# Patient Record
Sex: MALE | Race: WHITE | Employment: FULL TIME | URBAN - METROPOLITAN AREA
[De-identification: names, ages, dates, MRNs, and addresses within clinical notes are randomized per-mention and may not be internally consistent; named-entity substitution may affect disease eponyms.]

---

## 2024-09-03 ENCOUNTER — TELEPHONE (OUTPATIENT)
Dept: ORTHOPEDIC SURGERY | Age: 54
End: 2024-09-03

## 2024-09-03 RX ORDER — OMEPRAZOLE 40 MG/1
40 CAPSULE, DELAYED RELEASE ORAL DAILY
COMMUNITY
Start: 2024-07-19

## 2024-09-03 RX ORDER — MULTIVITAMIN
1 TABLET ORAL DAILY
COMMUNITY

## 2024-09-03 RX ORDER — EPINEPHRINE 0.3 MG/.3ML
0.3 INJECTION SUBCUTANEOUS SEE ADMIN INSTRUCTIONS
COMMUNITY
Start: 2024-07-19

## 2024-09-03 RX ORDER — FLUOROURACIL 50 MG/G
CREAM TOPICAL
COMMUNITY
Start: 2023-07-20

## 2024-09-03 NOTE — TELEPHONE ENCOUNTER
General Question     Subject: FOR JUAN DANIEL  Patient and /or Facility Request: Savage Hannon   Contact Number:[phone]       NP/OLD RECONSTRUCTIVE RT KNEE SX IS GIVING OUT FROM 1993, DONE BY DR ESTHER HOUSE/ XR DONE AT [facility]

## 2024-09-03 NOTE — TELEPHONE ENCOUNTER
Called and spoke with patient. He had X-rays at [facility]. Patient scheduled for 9-6-24.

## 2024-09-06 ENCOUNTER — OFFICE VISIT (OUTPATIENT)
Dept: ORTHOPEDIC SURGERY | Age: 54
End: 2024-09-06

## 2024-09-06 VITALS — BODY MASS INDEX: 24.17 KG/M2 | HEIGHT: 67 IN | RESPIRATION RATE: 12 BRPM | WEIGHT: 154 LBS

## 2024-09-06 DIAGNOSIS — M25.561 ACUTE PAIN OF RIGHT KNEE: Primary | ICD-10-CM

## 2024-09-06 RX ORDER — METHYLPREDNISOLONE ACETATE 40 MG/ML
80 INJECTION, SUSPENSION INTRA-ARTICULAR; INTRALESIONAL; INTRAMUSCULAR; SOFT TISSUE ONCE
Status: COMPLETED | OUTPATIENT
Start: 2024-09-06 | End: 2024-09-06

## 2024-09-06 RX ADMIN — Medication 3 ML: at 11:19

## 2024-09-06 RX ADMIN — METHYLPREDNISOLONE ACETATE 80 MG: 40 INJECTION, SUSPENSION INTRA-ARTICULAR; INTRALESIONAL; INTRAMUSCULAR; SOFT TISSUE at 11:20

## 2024-09-06 NOTE — PROGRESS NOTES
Savage Hannon is seen today for complaints of right knee pain.    He had an ACL reconstruction in 1993.  He has been very active.  He runs.  About 5 months ago he twisted his knee while running and now it remains painful and stiff and gives way.  Pain ranges from 5-9 out of 10.        He has reflux and high cholesterol.  Past medical history significant for rotator cuff repair September 2021.    History: Patient's relevant past family, medical, and social history are reviewed as part of today's visit. ROS of pertinent positives and negatives as above; otherwise negative.    General Exam:    Vitals: Resp. rate 12, height 1.702 m (5' 7\"), weight 69.9 kg (154 lb).  Constitutional: Patient is adequately groomed with no evidence of malnutrition  Mental Status: The patient is oriented to time, place and person.  The patient's mood and affect are appropriate.  Gait:  Patient walks with normal gait and station.  Lymphatic: The lymphatic examination bilaterally reveals all areas to be without enlargement or induration.  Vascular: Examination reveals no swelling or calf tenderness.  Peripheral pulses are palpable and 2+.  Neurological: The patient has good coordination.  There is no weakness or sensory deficit.    Skin:    Head/Neck: inspection reveals no rashes, ulcerations or lesions.  Trunk:  inspection reveals no rashes, ulcerations or lesions.  Right Lower Extremity: inspection reveals no rashes, ulcerations or lesions.  Left Lower Extremity: inspection reveals no rashes, ulcerations or lesions.        Examination of the bilateral hips reveals normal flexion and extension.  There is no restriction in rotation.  There is no tenderness to palpation anteriorly posteriorly or laterally.    Left knee examination demonstrates no effusion.  There is no tenderness to palpation over the medial or lateral joint line.  There is no discomfort over the patellar tendon.  There is no palpable popliteal cyst.  Sensation is intact.

## 2024-10-11 ENCOUNTER — OFFICE VISIT (OUTPATIENT)
Dept: ORTHOPEDIC SURGERY | Age: 54
End: 2024-10-11
Payer: COMMERCIAL

## 2024-10-11 VITALS — BODY MASS INDEX: 24.17 KG/M2 | WEIGHT: 154 LBS | RESPIRATION RATE: 12 BRPM | HEIGHT: 67 IN

## 2024-10-11 DIAGNOSIS — M25.561 ACUTE PAIN OF RIGHT KNEE: Primary | ICD-10-CM

## 2024-10-11 DIAGNOSIS — M17.11 ARTHRITIS OF RIGHT KNEE: ICD-10-CM

## 2024-10-11 PROCEDURE — 99214 OFFICE O/P EST MOD 30 MIN: CPT | Performed by: ORTHOPAEDIC SURGERY

## 2024-10-11 RX ORDER — MELOXICAM 15 MG/1
15 TABLET ORAL DAILY
Qty: 30 TABLET | Refills: 3 | Status: SHIPPED | OUTPATIENT
Start: 2024-10-11

## 2024-10-11 NOTE — PROGRESS NOTES
Savage Hannon is seen today for 1 month follow-up after aspiration and injection for significant right knee arthritis.  It helped for about 3 weeks but his pain is returned.  He is very frustrated because he wants to resume exercise activity.  Pain ranges from 4-6 out of 10.  General Exam:    Vitals: Resp. rate 12, height 1.702 m (5' 7\"), weight 69.9 kg (154 lb).  Constitutional: Patient is adequately groomed with no evidence of malnutrition  Mental Status: The patient is oriented to time, place and person.  The patient's mood and affect are appropriate.          Right knee has significant pain over the medial joint line.  He has severe crepitation.    I again reviewed his x-rays showing severe arthritic change.    Assessment: Significant arthritis that did not respond well to injection.    Plan: He needs to least contemplate arthroplasty.  I am writing a prescription for meloxicam.  I am asking to see Dr. Nava for his consideration of the neck step in surgical treatment.  His insurance does not cover viscosupplementation

## 2024-10-21 ENCOUNTER — OFFICE VISIT (OUTPATIENT)
Dept: ORTHOPEDIC SURGERY | Age: 54
End: 2024-10-21
Payer: COMMERCIAL

## 2024-10-21 VITALS — BODY MASS INDEX: 24.17 KG/M2 | WEIGHT: 154 LBS | HEIGHT: 67 IN

## 2024-10-21 DIAGNOSIS — M17.11 ARTHRITIS OF RIGHT KNEE: Primary | ICD-10-CM

## 2024-10-21 PROCEDURE — 99213 OFFICE O/P EST LOW 20 MIN: CPT | Performed by: ORTHOPAEDIC SURGERY

## 2024-10-22 NOTE — PROGRESS NOTES
[facility]  Office Visit    Chief Complaint: Right knee pain    HPI:  Savage Hannon is a 54 y.o. who is here for evaluation of right knee pain.  He has been seen and treated by Dr. Fisher in the past.  He does report a history of right knee ACL tear with playing softballs in 1993.  He underwent ACL reconstruction with Dr. Hargrove at that time.  He has had intermittent pain over the last 2 years.  Symptoms have been becoming more significant and increasing in frequency.  He has pain on a near daily basis at this point.  He takes meloxicam to help relieve the pain.  He has undergone aspiration and steroid injection in the past, most recently in September.  He reports pain mostly anterior and along the medial joint line.  He finds himself limping.  He denies hip pain.  He walks without assistive device.  He is otherwise healthy.      Past Medical History:   Diagnosis Date    Chest pain     stress normal in March 2015    GERD (gastroesophageal reflux disease)     HLD (hyperlipidemia)     Hypogonadism male         ROS:  Constitutional: denies fever, chills, weight loss  MSK: denies pain in other joints, muscle aches  Neurological: denies numbness, tingling, weakness    Exam:  Ht 1.702 m (5' 7\")   Wt 69.9 kg (154 lb)   BMI 24.12 kg/m²      Appearance: sitting in exam room chair, appears to be in no acute distress, awake and alert  Resp: unlabored breathing on room air  Skin: warm, dry and intact with out erythema or significant increased temperature  Neuro: grossly intact both lower extremities. Intact sensation to light touch. Motor exam 4+ to 5/5 in all major motor groups.  RLE: Examination reveals that active knee range of motion is 0 to 130 degrees.  There is varus deformity, positive crepitus, positive joint line tenderness, positive antalgic gait.  Neurologically, plantar flexion and dorsiflexion is intact. 5/5 strength.     Imaging:  Prior right knee radiographs reviewed are

## 2024-10-24 ENCOUNTER — TELEPHONE (OUTPATIENT)
Dept: ORTHOPEDIC SURGERY | Age: 54
End: 2024-10-24

## 2025-01-15 ENCOUNTER — TELEPHONE (OUTPATIENT)
Dept: ORTHOPEDIC SURGERY | Age: 55
End: 2025-01-15